# Patient Record
Sex: FEMALE | Race: WHITE
[De-identification: names, ages, dates, MRNs, and addresses within clinical notes are randomized per-mention and may not be internally consistent; named-entity substitution may affect disease eponyms.]

---

## 2017-01-16 ENCOUNTER — HOSPITAL ENCOUNTER (OUTPATIENT)
Dept: HOSPITAL 66 - MMPC | Age: 72
End: 2017-01-16
Attending: FAMILY MEDICINE
Payer: COMMERCIAL

## 2017-01-16 DIAGNOSIS — L30.1: Primary | ICD-10-CM

## 2017-01-16 DIAGNOSIS — I10: ICD-10-CM

## 2017-01-16 DIAGNOSIS — G47.00: ICD-10-CM

## 2017-01-16 PROCEDURE — 99213 OFFICE O/P EST LOW 20 MIN: CPT

## 2017-02-23 ENCOUNTER — OFFICE VISIT (OUTPATIENT)
Dept: URGENT CARE | Facility: PHYSICIAN GROUP | Age: 72
End: 2017-02-23
Payer: MEDICARE

## 2017-02-23 VITALS
DIASTOLIC BLOOD PRESSURE: 92 MMHG | BODY MASS INDEX: 37.03 KG/M2 | TEMPERATURE: 99.5 F | WEIGHT: 209 LBS | OXYGEN SATURATION: 94 % | HEART RATE: 86 BPM | SYSTOLIC BLOOD PRESSURE: 130 MMHG | HEIGHT: 63 IN | RESPIRATION RATE: 12 BRPM

## 2017-02-23 DIAGNOSIS — J40 BRONCHITIS: ICD-10-CM

## 2017-02-23 PROCEDURE — 99213 OFFICE O/P EST LOW 20 MIN: CPT | Performed by: EMERGENCY MEDICINE

## 2017-02-23 PROCEDURE — 3014F SCREEN MAMMO DOC REV: CPT | Mod: 8P | Performed by: EMERGENCY MEDICINE

## 2017-02-23 PROCEDURE — G8419 CALC BMI OUT NRM PARAM NOF/U: HCPCS | Performed by: EMERGENCY MEDICINE

## 2017-02-23 PROCEDURE — 3017F COLORECTAL CA SCREEN DOC REV: CPT | Mod: 8P | Performed by: EMERGENCY MEDICINE

## 2017-02-23 PROCEDURE — G8432 DEP SCR NOT DOC, RNG: HCPCS | Performed by: EMERGENCY MEDICINE

## 2017-02-23 PROCEDURE — 1036F TOBACCO NON-USER: CPT | Performed by: EMERGENCY MEDICINE

## 2017-02-23 PROCEDURE — G8484 FLU IMMUNIZE NO ADMIN: HCPCS | Performed by: EMERGENCY MEDICINE

## 2017-02-23 PROCEDURE — 4040F PNEUMOC VAC/ADMIN/RCVD: CPT | Mod: 8P | Performed by: EMERGENCY MEDICINE

## 2017-02-23 PROCEDURE — 1101F PT FALLS ASSESS-DOCD LE1/YR: CPT | Mod: 8P | Performed by: EMERGENCY MEDICINE

## 2017-02-23 RX ORDER — BENZONATATE 100 MG/1
100 CAPSULE ORAL 3 TIMES DAILY PRN
Qty: 60 CAP | Refills: 0 | Status: SHIPPED | OUTPATIENT
Start: 2017-02-23

## 2017-02-23 ASSESSMENT — ENCOUNTER SYMPTOMS
FEVER: 0
EYE REDNESS: 0
HEMOPTYSIS: 0
SPUTUM PRODUCTION: 0
CHILLS: 0
PALPITATIONS: 0
SHORTNESS OF BREATH: 0
ABDOMINAL PAIN: 0
SENSORY CHANGE: 0
VOMITING: 0
COUGH: 1
SWEATS: 0
EYE DISCHARGE: 0
NERVOUS/ANXIOUS: 0
BACK PAIN: 0
SPEECH CHANGE: 0
NAUSEA: 0
HEADACHES: 0
SORE THROAT: 0

## 2017-02-23 ASSESSMENT — COPD QUESTIONNAIRES: COPD: 0

## 2017-02-23 NOTE — PROGRESS NOTES
Subjective:      Judy K Behunin is a 71 y.o. female who presents with Cough            Cough  This is a new problem. The current episode started in the past 7 days (patient's had a cough for the past 4 days.). The problem has been unchanged. The cough is non-productive. Pertinent negatives include no chest pain, chills, eye redness, fever, headaches, hemoptysis, rash, sore throat, shortness of breath or sweats. She has tried nothing for the symptoms. There is no history of asthma or COPD.   No Known Allergies   Social History     Social History   • Marital Status:      Spouse Name: N/A   • Number of Children: N/A   • Years of Education: N/A     Occupational History   • Not on file.     Social History Main Topics   • Smoking status: Never Smoker    • Smokeless tobacco: Not on file   • Alcohol Use: Not on file   • Drug Use: Not on file   • Sexual Activity: Not on file     Other Topics Concern   • Not on file     Social History Narrative     Past Medical History   Diagnosis Date   • Hypertension      Current Outpatient Prescriptions on File Prior to Visit   Medication Sig Dispense Refill   • cyclobenzaprine (FLEXERIL) 10 MG Tab Take 1 Tab by mouth 3 times a day as needed. 30 Tab 0     No current facility-administered medications on file prior to visit.      Review of Systems   Constitutional: Negative for fever and chills.   HENT: Negative for hearing loss and sore throat.    Eyes: Negative for discharge and redness.   Respiratory: Positive for cough. Negative for hemoptysis, sputum production and shortness of breath.    Cardiovascular: Negative for chest pain and palpitations.   Gastrointestinal: Negative for nausea, vomiting and abdominal pain.   Genitourinary: Negative.    Musculoskeletal: Negative for back pain.   Skin: Negative for itching and rash.   Neurological: Negative for sensory change, speech change and headaches.   Psychiatric/Behavioral: The patient is not nervous/anxious.           Objective:  "    Blood pressure 130/92, pulse 86, temperature 37.5 °C (99.5 °F), resp. rate 12, height 1.605 m (5' 3.19\"), weight 94.802 kg (209 lb), SpO2 94 %.    Physical Exam   Constitutional: She appears well-developed and well-nourished. No distress.   HENT:   Head: Normocephalic and atraumatic.   Right Ear: External ear normal.   Left Ear: External ear normal.   Eyes: Left eye exhibits no discharge.   Cardiovascular: Normal rate.    Pulmonary/Chest: Effort normal and breath sounds normal. No stridor.   Abdominal: Soft. She exhibits no distension. There is no tenderness.   Musculoskeletal: Normal range of motion.   Lymphadenopathy:     She has no cervical adenopathy.   Skin: Skin is dry. No rash noted. She is not diaphoretic. No erythema.   Psychiatric: She has a normal mood and affect. Her behavior is normal.   Vitals reviewed.              Assessment/Plan:     Diagnosis acute bronchitis    I am recommending the patient initiate/ continue hydration efforts including the use of a vaporizer/humidifier/ netti pot. I also recommend the pt, initiate Mucinex . In addition the patient will initiate the prescribed prescription medication/s: Tessalon Perles for cough.. If the patient's condition exacerbates with worsening dysphagia, shortness of breath, uncontrolled fever, headache or chest pressure he/she will return immediately to the urgent care or go to  the emergency department for further evaluation.    CRISTA Méndez        "

## 2017-02-23 NOTE — MR AVS SNAPSHOT
"        Judy K Behunin   2017 2:55 PM   Office Visit   MRN: [id number]    Department:  [facility]   Dept Phone:  [phone]    Description:  Female : 1945   Provider:  CRISTA Méndez M.D.           Reason for Visit     Cough X 4 days       Allergies as of 2017     No Known Allergies      You were diagnosed with     Bronchitis   [029347]         Vital Signs     Blood Pressure Pulse Temperature Respirations Height Weight    130/92 mmHg 86 37.5 °C (99.5 °F) 12 1.605 m (5' 3.19\") 94.802 kg (209 lb)    Body Mass Index Oxygen Saturation Smoking Status             36.80 kg/m2 94% Never Smoker          Basic Information     Date Of Birth Sex Race Ethnicity Preferred Language    1945 Female White Non- English      Health Maintenance        Date Due Completion Dates    IMM DTaP/Tdap/Td Vaccine (1 - Tdap) 6/15/1966 ---    PAP SMEAR 6/15/1968 ---    MAMMOGRAM 6/15/1987 ---    COLONOSCOPY 6/15/1997 ---    IMM ZOSTER VACCINE 6/15/2007 ---    BONE DENSITY 6/15/2012 ---    IMM PNEUMOCOCCAL 65+ (ADULT) LOW/MEDIUM RISK SERIES (1 of 2 - PCV13) 6/15/2012 ---    IMM INFLUENZA (1) 2016 ---            Current Immunizations     No immunizations on file.      Below and/or attached are the medications your provider expects you to take. Review all of your home medications and newly ordered medications with your provider and/or pharmacist. Follow medication instructions as directed by your provider and/or pharmacist. Please keep your medication list with you and share with your provider. Update the information when medications are discontinued, doses are changed, or new medications (including over-the-counter products) are added; and carry medication information at all times in the event of emergency situations     Allergies:  No Known Allergies          Medications  Valid as of: 2017 -  3:24 PM    Generic Name Brand Name Tablet Size Instructions for use    Cyclobenzaprine HCl (Tab) " FLEXERIL 10 MG Take 1 Tab by mouth 3 times a day as needed.        NON SPECIFIED   Take 30 mg by mouth 1 time daily as needed (Tamezepam).        .                 Medicines prescribed today were sent to:     [facility] [address] [place] [address] [place] - [address] [place] [address]    Phone: [phone] Fax: [phone]    Open 24 Hours?: No      Medication refill instructions:       If your prescription bottle indicates you have medication refills left, it is not necessary to call your provider’s office. Please contact your pharmacy and they will refill your medication.    If your prescription bottle indicates you do not have any refills left, you may request refills at any time through one of the following ways: The online [facility] system (except Urgent Care), by calling your provider’s office, or by asking your pharmacy to contact your provider’s office with a refill request. Medication refills are processed only during regular business hours and may not be available until the next business day. Your provider may request additional information or to have a follow-up visit with you prior to refilling your medication.   *Please Note: Medication refills are assigned a new Rx number when refilled electronically. Your pharmacy may indicate that no refills were authorized even though a new prescription for the same medication is available at the pharmacy. Please request the medicine by name with the pharmacy before contacting your provider for a refill.           jaeyoshart Status: Patient Declined